# Patient Record
Sex: FEMALE | Race: WHITE | NOT HISPANIC OR LATINO
[De-identification: names, ages, dates, MRNs, and addresses within clinical notes are randomized per-mention and may not be internally consistent; named-entity substitution may affect disease eponyms.]

---

## 2021-01-20 ENCOUNTER — RESULT REVIEW (OUTPATIENT)
Age: 61
End: 2021-01-20

## 2022-02-09 ENCOUNTER — NON-APPOINTMENT (OUTPATIENT)
Age: 62
End: 2022-02-09

## 2022-02-09 ENCOUNTER — APPOINTMENT (OUTPATIENT)
Dept: PULMONOLOGY | Facility: CLINIC | Age: 62
End: 2022-02-09
Payer: COMMERCIAL

## 2022-02-09 ENCOUNTER — LABORATORY RESULT (OUTPATIENT)
Age: 62
End: 2022-02-09

## 2022-02-09 VITALS
SYSTOLIC BLOOD PRESSURE: 132 MMHG | DIASTOLIC BLOOD PRESSURE: 74 MMHG | BODY MASS INDEX: 22.66 KG/M2 | OXYGEN SATURATION: 96 % | HEIGHT: 65 IN | TEMPERATURE: 98.4 F | HEART RATE: 105 BPM | WEIGHT: 136 LBS

## 2022-02-09 DIAGNOSIS — R03.0 ELEVATED BLOOD-PRESSURE READING, W/OUT DIAGNOSIS OF HYPERTENSION: ICD-10-CM

## 2022-02-09 DIAGNOSIS — Z82.49 FAMILY HISTORY OF ISCHEMIC HEART DISEASE AND OTHER DISEASES OF THE CIRCULATORY SYSTEM: ICD-10-CM

## 2022-02-09 DIAGNOSIS — K63.5 POLYP OF COLON: ICD-10-CM

## 2022-02-09 DIAGNOSIS — Z80.3 FAMILY HISTORY OF MALIGNANT NEOPLASM OF BREAST: ICD-10-CM

## 2022-02-09 DIAGNOSIS — Z00.00 ENCOUNTER FOR GENERAL ADULT MEDICAL EXAMINATION W/OUT ABNORMAL FINDINGS: ICD-10-CM

## 2022-02-09 DIAGNOSIS — J32.9 CHRONIC SINUSITIS, UNSPECIFIED: ICD-10-CM

## 2022-02-09 DIAGNOSIS — D51.0 VITAMIN B12 DEFICIENCY ANEMIA DUE TO INTRINSIC FACTOR DEFICIENCY: ICD-10-CM

## 2022-02-09 DIAGNOSIS — Z78.9 OTHER SPECIFIED HEALTH STATUS: ICD-10-CM

## 2022-02-09 DIAGNOSIS — J34.2 DEVIATED NASAL SEPTUM: ICD-10-CM

## 2022-02-09 DIAGNOSIS — J45.909 UNSPECIFIED ASTHMA, UNCOMPLICATED: ICD-10-CM

## 2022-02-09 DIAGNOSIS — Z57.9 UNSPECIFIED ASTHMA, UNCOMPLICATED: ICD-10-CM

## 2022-02-09 PROCEDURE — 99204 OFFICE O/P NEW MOD 45 MIN: CPT | Mod: 25

## 2022-02-09 PROCEDURE — 71046 X-RAY EXAM CHEST 2 VIEWS: CPT

## 2022-02-09 PROCEDURE — 93000 ELECTROCARDIOGRAM COMPLETE: CPT

## 2022-02-09 PROCEDURE — 36415 COLL VENOUS BLD VENIPUNCTURE: CPT

## 2022-02-09 SDOH — HEALTH STABILITY - PHYSICAL HEALTH: OCCUPATIONAL EXPOSURE TO UNSPECIFIED RISK FACTOR: Z57.9

## 2022-02-09 NOTE — HISTORY OF PRESENT ILLNESS
[Never] : never [TextBox_4] : She is doing okay.  She is started complaining of headache in the back of the neck and palpitation she is under a lot of stress 3 of her siblings  recently.  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  She has a deviated septum and she had a trauma to the nose as a child and she seen an ENT and they told her that she needs surgery but she had 3 different other opinions.  She has 2/her nose on daily basis with saline otherwise nose will get congested and dry the saline spray helps her and she drains a lot of secretion which is white she is also complaining of excessive snoring and mouth breathing at night.  She also has a lot of postnasal drip and that can cause some cough.  She is very compliant with her diet and she walks about 2-1/2 miles and go 113 steps of stairs without any difficulty.  She had a mammogram about 3 years ago which was abnormal and she was told to repeat it in 6 months but this was 3 years ago she is up-to-date with colonoscopy and endoscopy

## 2022-02-09 NOTE — REVIEW OF SYSTEMS
[Dry Eyes] : no dry eyes [Ear Disturbance] : no ear disturbance [Epistaxis] : no epistaxis [Sore Throat] : no sore throat [Eye Irritation] : no eye irritation [Nasal Congestion] : nasal congestion [Postnasal Drip] : postnasal drip [Dry Mouth] : no dry mouth [Sinus Problems] : sinus problems [Mouth Ulcers] : no mouth ulcers [Poor Dentition] : no poor dentition [Edentulous] : no edentulous [Negative] : Endocrine

## 2022-02-09 NOTE — PROCEDURE
[FreeTextEntry1] : EKG RSR normal\par CXR\par PA and lateral good effort\par \par Chest wall normal\par \par Pleura space normal\par \par Diaphragm is normal\par \par Cardiac silhouette normal\par \par Parenchymal abnormality none\par \par Impression normal chest x-ray

## 2022-02-09 NOTE — ASSESSMENT
[FreeTextEntry1] : Occupation asthma\par \par The patient has no family history of asthma.  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  In my opinion her condition was related to occupational asthma at this point she is asymptomatic and no further treatment required at this time but I educated on her condition and my manifest adult onset asthma in the future.\par \par Chronic sinusitis\par \par I informed the patient the association between asthma and chronic sinusitis and nasal polyps.  The condition could be related to the deviated nasal septum.  She is stable with the saline spray which he uses on daily basis at least twice.  At this point she is seen about 4 ENT and I will refer the patient for ENT for further evaluation.  The patient will require CT scan of the sinus and the nose to evaluate the severity of the nasal deviated septum.  No indication for antibiotic at this point\par \par Deviated nasal septum\par \par Patient is a mouth breather and snores at night and that is related to his severity of the nasal septum.  We referred the patient to ENT and she might need images.\par \par History permission anemia\par \par Patient is getting endoscopy regular basis and will follow up on the hemoglobin.Elevated blood pressure\par \par The patient is under stress and depression the headache is more of a tension headache in the back and the neck up to the mid scalpel.  The patient had 3 siblings  within the last few months.\par \par I informed the patient at this point I would not address her blood pressure and will follow up in the echocardiogram that she is a high risk for cardiovascular disease due to the strong family history\par \par I informed the patient that she would require all mammogram with ultrasound no indication for MRI of the breast at this point but she will follow up with her primary

## 2022-02-21 LAB
25(OH)D3 SERPL-MCNC: 32.8 NG/ML
ALBUMIN SERPL ELPH-MCNC: 4.6 G/DL
ALP BLD-CCNC: 63 U/L
ALT SERPL-CCNC: 38 U/L
ANION GAP SERPL CALC-SCNC: 14 MMOL/L
APPEARANCE: CLEAR
AST SERPL-CCNC: 39 U/L
BASOPHILS # BLD AUTO: 0.04 K/UL
BASOPHILS NFR BLD AUTO: 0.9 %
BILIRUB SERPL-MCNC: 0.2 MG/DL
BILIRUBIN URINE: NEGATIVE
BLOOD URINE: NEGATIVE
BUN SERPL-MCNC: 15 MG/DL
CALCIUM SERPL-MCNC: 9.6 MG/DL
CHLORIDE SERPL-SCNC: 101 MMOL/L
CHOLEST SERPL-MCNC: 215 MG/DL
CO2 SERPL-SCNC: 23 MMOL/L
COLOR: NORMAL
CREAT SERPL-MCNC: 0.68 MG/DL
EOSINOPHIL # BLD AUTO: 0.11 K/UL
EOSINOPHIL NFR BLD AUTO: 2.4 %
ESTIMATED AVERAGE GLUCOSE: 105 MG/DL
GLUCOSE QUALITATIVE U: NEGATIVE
GLUCOSE SERPL-MCNC: 91 MG/DL
HBA1C MFR BLD HPLC: 5.3 %
HCT VFR BLD CALC: 35.9 %
HDLC SERPL-MCNC: 82 MG/DL
HGB BLD-MCNC: 10.1 G/DL
IMM GRANULOCYTES NFR BLD AUTO: 0.2 %
KETONES URINE: NEGATIVE
LDLC SERPL CALC-MCNC: 106 MG/DL
LEUKOCYTE ESTERASE URINE: NEGATIVE
LYMPHOCYTES # BLD AUTO: 1.21 K/UL
LYMPHOCYTES NFR BLD AUTO: 26.8 %
MAN DIFF?: NORMAL
MCHC RBC-ENTMCNC: 21.6 PG
MCHC RBC-ENTMCNC: 28.1 GM/DL
MCV RBC AUTO: 76.9 FL
MONOCYTES # BLD AUTO: 0.49 K/UL
MONOCYTES NFR BLD AUTO: 10.9 %
NEUTROPHILS # BLD AUTO: 2.65 K/UL
NEUTROPHILS NFR BLD AUTO: 58.8 %
NITRITE URINE: NEGATIVE
NONHDLC SERPL-MCNC: 133 MG/DL
PH URINE: 7.5
PLATELET # BLD AUTO: 258 K/UL
POTASSIUM SERPL-SCNC: 4.2 MMOL/L
PROT SERPL-MCNC: 7 G/DL
PROTEIN URINE: NEGATIVE
RBC # BLD: 4.67 M/UL
RBC # FLD: 20.5 %
SODIUM SERPL-SCNC: 138 MMOL/L
SPECIFIC GRAVITY URINE: 1.02
T3 SERPL-MCNC: 112 NG/DL
T4 SERPL-MCNC: 6.4 UG/DL
TRIGL SERPL-MCNC: 138 MG/DL
TSH SERPL-ACNC: 1.78 UIU/ML
UROBILINOGEN URINE: NORMAL
WBC # FLD AUTO: 4.51 K/UL

## 2022-06-21 ENCOUNTER — RESULT REVIEW (OUTPATIENT)
Age: 62
End: 2022-06-21